# Patient Record
Sex: FEMALE | Race: WHITE | NOT HISPANIC OR LATINO | Employment: PART TIME | URBAN - METROPOLITAN AREA
[De-identification: names, ages, dates, MRNs, and addresses within clinical notes are randomized per-mention and may not be internally consistent; named-entity substitution may affect disease eponyms.]

---

## 2023-10-06 ENCOUNTER — OFFICE VISIT (OUTPATIENT)
Dept: OBGYN CLINIC | Facility: CLINIC | Age: 53
End: 2023-10-06
Payer: COMMERCIAL

## 2023-10-06 VITALS
HEIGHT: 64 IN | BODY MASS INDEX: 22.23 KG/M2 | WEIGHT: 130.2 LBS | SYSTOLIC BLOOD PRESSURE: 100 MMHG | DIASTOLIC BLOOD PRESSURE: 62 MMHG

## 2023-10-06 DIAGNOSIS — Z80.3 FAMILY HISTORY OF BREAST CANCER: ICD-10-CM

## 2023-10-06 DIAGNOSIS — N92.1 MENORRHAGIA WITH IRREGULAR CYCLE: ICD-10-CM

## 2023-10-06 DIAGNOSIS — Z30.9 ENCOUNTER FOR CONTRACEPTIVE MANAGEMENT, UNSPECIFIED TYPE: ICD-10-CM

## 2023-10-06 DIAGNOSIS — N88.2 CERVICAL OS STENOSIS: ICD-10-CM

## 2023-10-06 DIAGNOSIS — N95.1 MENOPAUSAL VAGINAL DRYNESS: ICD-10-CM

## 2023-10-06 DIAGNOSIS — Z12.4 CERVICAL CANCER SCREENING: ICD-10-CM

## 2023-10-06 DIAGNOSIS — Z01.419 ENCOUNTER FOR ANNUAL ROUTINE GYNECOLOGICAL EXAMINATION: Primary | ICD-10-CM

## 2023-10-06 DIAGNOSIS — Z12.31 ENCOUNTER FOR SCREENING MAMMOGRAM FOR MALIGNANT NEOPLASM OF BREAST: ICD-10-CM

## 2023-10-06 PROCEDURE — S0610 ANNUAL GYNECOLOGICAL EXAMINA: HCPCS | Performed by: OBSTETRICS & GYNECOLOGY

## 2023-10-06 RX ORDER — ESTRADIOL 10 UG/1
TABLET VAGINAL
COMMUNITY
Start: 2023-06-30 | End: 2023-10-06

## 2023-10-06 RX ORDER — NORGESTIMATE AND ETHINYL ESTRADIOL 0.25-0.035
KIT ORAL
COMMUNITY
End: 2023-10-06

## 2023-10-06 RX ORDER — ESTRADIOL 0.1 MG/G
CREAM VAGINAL
Qty: 42.5 G | Refills: 4 | Status: SHIPPED | OUTPATIENT
Start: 2023-10-06

## 2023-10-06 RX ORDER — MISOPROSTOL 100 UG/1
TABLET ORAL
Qty: 1 TABLET | Refills: 0 | Status: SHIPPED | OUTPATIENT
Start: 2023-10-06

## 2023-10-06 RX ORDER — DIAPHRAGMS, CONTOURED 65 MM-80MM
DIAPHRAGM VAGINAL AS NEEDED
Qty: 1 EACH | Refills: 0 | Status: SHIPPED | OUTPATIENT
Start: 2023-10-06

## 2023-10-06 NOTE — PROGRESS NOTES
Annual Wellness Visit  [address]  [address] [facility], [address], [place], [address] [facility]    ASSESSMENT/PLAN: Rick Miranda is a 50 y.o. [id number] who presents for annual gynecologic exam.    Encounter for routine gynecologic examination  - Routine well woman exam completed today. - Cervical Cancer Screening: Current ASCCP Guidelines reviewed. Last Pap: last 1-2 years normal per pt. Next Pap Due: today, routine.  - Contraceptive counseling discussed. Current contraception: abstinence - due to pain. - Breast Cancer Screening: Last Mammogram 2024 normal per pt  - Colorectal cancer screening last 4/2023 per pt, next due 2033. - The following were reviewed in today's visit: mammography screening ordered, use and side effects of OCPs, menopause, exercise and healthy diet    Additional problems addressed during this visit:  1. Encounter for annual routine gynecological examination    2. Encounter for screening mammogram for malignant neoplasm of breast  -     Mammo screening bilateral w 3d & cad; Future    3. Family history of breast cancer  -     Mammo screening bilateral w 3d & cad; Future    4. Cervical cancer screening  -     IGP, Aptima HPV, Rfx 16/18,45    5. Menopausal vaginal dryness  Assessment & Plan:  Patient reports long history vaginal dryness and painful intercourse. At this point avoids intercourse. Has been given vaginal estrogen in past by gyn, but admits doesn't use regularly. Discussed lack of estrogen even in perimenopause can cause vaginal dryness, pain with intercourse. Sometimes this can be improved with vaginal lubricants or moisturizers, but if not vaginal estrogen often helps. Discussed cream, tablets and ring. Reviewed non-systemic estrogen, unlike hormone replacement therapy, so does not have same risks. Will try vaginal estrogen cream, which she already has. Discussed proper use and expected time to improvement.     Orders:  -     estradiol (ESTRACE) 0.1 mg/g vaginal cream; Start nightly 1 gram in vagina x 2 weeks, then decrease to 2-3x/week. May decrease to weekly if still effective at lower dose. 6. Menorrhagia with irregular cycle  Assessment & Plan:  Discussed w/ patient possible causes of bleeding every 2 weeks include perimenopause, fibroids, endometrial polyps, precancer or cancer of cervix or uterus. Recommend evaluation with combination of ultrasound, endometrial biopsy. Plan return in couple weeks for endometrial biopsy (I am out of office for most of month, can be done with another provider) and get ultrasound to evaluate. Then return to discuss results and plan. Will hold considering progesterone contraception options while evaluating bleeding. Orders:  -     US pelvis complete w transvaginal; Future    7. Cervical os stenosis  Assessment & Plan:  Cervical stenosis noted on exam while doing pap. Not clinically significant except patient to return for endometrial biopsy. Recommend misoprostol vaginally prior to endometrial biopsy attempt to help in softening cervix and successful biopsy. Orders:  -     miSOPROStol (Cytotec) 100 mcg tablet; Place 1/2 tablet in vagina night before procedure and 1/2 tablet in vagina the morning of procedure. 8. Encounter for contraceptive management, unspecified type  A/P:   Patient recently on OCPs with irregular bleeding. Reviewed risks of continuing estrogen containing contraception (OCPs) with advancing age. Discussed that continued use of OCPs can mask natural menopause, by decreasing symptoms of menopause and causing continuation of periods past natural cessation. Until menopause women can conceive and women of advanced maternal age have increase risks in pregnancy, but also there are small increases in risk of venous thromboembolism, myocardial infarction, or stroke, in women >46yo who use OCPs.   Other hormonal contraceptive options not containing estrogen, ie barrier methods, POs, implants, and IUDs have less acute cardiovascular risks. Due to irregular bleeding and need for evaluation, I recommend holding on hormonal options currently. She is willing to try Caya Diaphragm (used diaphragm in past). Once evaluation for bleeding complete, can consider other options. Gave script and info for Caya Diaphragm.  -     Diaphragm Arc-Spring (Armando Dooley)  [facility] [address]; Insert into the vagina as needed (with intercourse)        Next visit: return for EMB, f/u 1 month consult; 1 year Wellness      CC:  Annual Gynecologic Examination    HPI: Lilian Turner is a 50 y.o. Eli Me who presents for annual gynecologic examination. Celine Arias is a new patient to our office for GYN Wellness. She has concerns for vaginal dryness, menopause and irregular bleeding. Last pap > 1 year ago which was normal, maybe h/o abnormal in past but returned to normal.  Mammo about 1 year ago - normal  Colonoscopy 2023 - next 10 years. Was on Sprintec for about 8 years. Stopped about 6 weeks ago because ran out of script. Reports 5 days of bleeding about every 2 weeks when on pills last couple months and then once off still every 2 weeks. Does admit missed some pills while taking them due to poor pill taker. Currently not sexually active due to vaginal dryness for 7 years - tried vaginal estrogen in past but admits not taking it correctly starts and stops. Gyn History  Patient's last menstrual period was 2023 (approximate). Last Pap: normal per pt 1-2 years    She  reports being sexually active and has had partner(s) who are male. She reports using the following method of birth control/protection: None. OB History      Past Medical History:  No date: IBS (irritable bowel syndrome)  : Tumor of jaw      Comment:  benign     Past Surgical History:  2014:  SECTION  : MANDIBLE SURGERY;  Right      Comment:  benign tumor removal with bone graft  : WISDOM TOOTH EXTRACTION; N/A     Family History Problem Relation Age of Onset   • Heart attack Father    • Breast cancer Paternal Grandmother    • Uterine cancer Neg Hx    • Ovarian cancer Neg Hx    • Colon cancer Neg Hx         Social History     Tobacco Use   • Smoking status: Never   • Smokeless tobacco: Never   Vaping Use   • Vaping Use: Never used   Substance Use Topics   • Alcohol use: Never   • Drug use: Never          Current Outpatient Medications:   •  Diaphragm Arc-Spring (Susannah Boot) [address] [facility] [address], Insert into the vagina as needed (with intercourse), Disp: 1 each, Rfl: 0  •  estradiol (ESTRACE) 0.1 mg/g vaginal cream, Start nightly 1 gram in vagina x 2 weeks, then decrease to 2-3x/week. May decrease to weekly if still effective at lower dose., Disp: 42.5 g, Rfl: 4  •  Estradiol 10 % CREA, Use, Disp: , Rfl:   •  miSOPROStol (Cytotec) 100 mcg tablet, Place 1/2 tablet in vagina night before procedure and 1/2 tablet in vagina the morning of procedure., Disp: 1 tablet, Rfl: 0  •  Multiple Vitamins-Minerals (WOMENS 50+ ADVANCED PO), Take by mouth, Disp: , Rfl:     She is allergic to ciprofloxacin. .    ROS negative except as noted in HPI    Objective:  /62   Ht 5' 4.25" (1.632 m)   Wt 59.1 kg (130 lb 3.2 oz)   LMP 09/22/2023 (Approximate)   Breastfeeding No   BMI 22.18 kg/m²      Physical Exam  Constitutional:       Appearance: Normal appearance. Chest:   Breasts:     Right: Normal. No mass or tenderness. Left: Normal. No mass or tenderness. Abdominal:      Palpations: Abdomen is soft. Tenderness: There is no abdominal tenderness. Genitourinary:     General: Normal vulva. Vagina: No bleeding or lesions. Cervix: No cervical bleeding (cervical stenosis on exam). Uterus: Normal. Not tender. Adnexa:         Right: No mass or tenderness. Left: No mass or tenderness. Rectum: No mass or external hemorrhoid. Normal anal tone.       Comments: Atrophic changes appropriate to age  Musculoskeletal:         General: Normal range of motion. Lymphadenopathy:      Upper Body:      Right upper body: No axillary adenopathy. Left upper body: No axillary adenopathy. Neurological:      Mental Status: She is alert and oriented to person, place, and time.    Psychiatric:         Mood and Affect: Mood normal.         Behavior: Behavior normal.

## 2023-10-06 NOTE — ASSESSMENT & PLAN NOTE
Cervical stenosis noted on exam while doing pap. Not clinically significant except patient to return for endometrial biopsy. Recommend misoprostol vaginally prior to endometrial biopsy attempt to help in softening cervix and successful biopsy.

## 2023-10-06 NOTE — PATIENT INSTRUCTIONS
- Maintain healthy weight with BMI ideally between 18-25.    - Eat a healthy diet, including multiple servings of vegetables and fruits, as well as lean protein sources. - Get at least 150 minutes of moderate aerobic activity or 75 minutes of vigorous aerobic activity a week, or a combination of moderate and vigorous activity. Greater amounts of exercise will provide an even greater health benefit. - Ensure diet provides 1200mg of Calcium daily (divided) and 800IU of vitamin D, or take supplements to meet this. - Safe sex practices recommended. - Resources - information on birth control and sexually transmitted infections - www.bedsider. org            - information on sexually transmitted infections - www.cdc.gov/std/     Evaluation for irregular bleeding   - pelvic ultrasound   - endometrial biopsy - use misoprostol vaginally before procedure   - return in 1 month with Dr. Billy King to review.

## 2023-10-07 NOTE — ASSESSMENT & PLAN NOTE
Discussed w/ patient possible causes of bleeding every 2 weeks include perimenopause, fibroids, endometrial polyps, precancer or cancer of cervix or uterus. Recommend evaluation with combination of ultrasound, endometrial biopsy. Plan return in couple weeks for endometrial biopsy (I am out of office for most of month, can be done with another provider) and get ultrasound to evaluate. Then return to discuss results and plan. Will hold considering progesterone contraception options while evaluating bleeding.

## 2023-10-07 NOTE — ASSESSMENT & PLAN NOTE
Patient reports long history vaginal dryness and painful intercourse. At this point avoids intercourse. Has been given vaginal estrogen in past by gyn, but admits doesn't use regularly. Discussed lack of estrogen even in perimenopause can cause vaginal dryness, pain with intercourse. Sometimes this can be improved with vaginal lubricants or moisturizers, but if not vaginal estrogen often helps. Discussed cream, tablets and ring. Reviewed non-systemic estrogen, unlike hormone replacement therapy, so does not have same risks. Will try vaginal estrogen cream, which she already has. Discussed proper use and expected time to improvement.

## 2023-10-11 LAB
CYTOLOGIST CVX/VAG CYTO: NORMAL
DX ICD CODE: NORMAL
HPV GENOTYPE REFLEX: NORMAL
HPV I/H RISK 4 DNA CVX QL PROBE+SIG AMP: NEGATIVE
OTHER STN SPEC: NORMAL
PATH REPORT.FINAL DX SPEC: NORMAL
SL AMB NOTE:: NORMAL
SL AMB SPECIMEN ADEQUACY: NORMAL
SL AMB TEST METHODOLOGY: NORMAL

## 2023-10-26 ENCOUNTER — TELEPHONE (OUTPATIENT)
Dept: OBGYN CLINIC | Facility: CLINIC | Age: 53
End: 2023-10-26

## 2023-10-26 NOTE — TELEPHONE ENCOUNTER
Thao Kim spoke with Jim ESPITIA, as to confusion about when to use medication prior to procedure. She could not remember what procedure needed to use medication for. Thao Kim was to insert Cytotec last night & this morning for EMBx appt. . Informed KARTHIK Wilson of Thao Kim not inserting medication last night or early this AM.. LMOM with [facility]'s message: It likely will not help if she takes it now. We can attempt the biopsy without it although should be aware there is a risk we me not be able to successfully perform or she can chose to reschedule. Awaiting pt response if keeping today's appt or rescheduling.

## 2023-10-26 NOTE — TELEPHONE ENCOUNTER
Patient is r/s for 11/9/23 with [facility]. Patient is asking for prescription refill on medication since she only have half left of the Cytotec.

## 2023-10-31 ENCOUNTER — TELEPHONE (OUTPATIENT)
Dept: OBGYN CLINIC | Facility: CLINIC | Age: 53
End: 2023-10-31

## 2023-11-07 ENCOUNTER — TELEPHONE (OUTPATIENT)
Dept: OBGYN CLINIC | Facility: CLINIC | Age: 53
End: 2023-11-07

## 2023-11-07 DIAGNOSIS — N88.2 CERVICAL OS STENOSIS: ICD-10-CM

## 2023-11-07 RX ORDER — MISOPROSTOL 100 UG/1
TABLET ORAL
Qty: 1 TABLET | Refills: 0 | Status: SHIPPED | OUTPATIENT
Start: 2023-11-07 | End: 2023-11-09

## 2023-11-07 NOTE — TELEPHONE ENCOUNTER
Rimma Whatley l/m needing to confirm upcoming appt. Return call to [facility], reviewed next appt 11/9/2023 is for endometrial biopsy with [facility] in [place] office. 11/24/2023 appt is with Dr. Flynn Daily for f/u to pelvic u/s and biopsy. Rimma Whatley is asking for additonal rx for cytotec. Inquiring when is she to be using the cytotec. Advised cytotec is for the EMB. She mistakenly used 1/2 of the cytotec tablet for an u/s that was previously scheduled. Requested additional rx for cytotec be forwarded to pharmacy on file for EMB on 11/9/2023.  Dr. Flynn Daily, please review and advise

## 2023-11-09 ENCOUNTER — PROCEDURE VISIT (OUTPATIENT)
Dept: OBGYN CLINIC | Facility: CLINIC | Age: 53
End: 2023-11-09

## 2023-11-09 VITALS
DIASTOLIC BLOOD PRESSURE: 60 MMHG | BODY MASS INDEX: 21.79 KG/M2 | HEIGHT: 65 IN | SYSTOLIC BLOOD PRESSURE: 108 MMHG | WEIGHT: 130.8 LBS

## 2023-11-09 DIAGNOSIS — N92.1 MENORRHAGIA WITH IRREGULAR CYCLE: Primary | ICD-10-CM

## 2023-11-09 DIAGNOSIS — Z32.02 PREGNANCY EXAMINATION OR TEST, NEGATIVE RESULT: ICD-10-CM

## 2023-11-09 LAB — SL AMB POCT URINE HCG: NEGATIVE

## 2023-11-09 NOTE — PROGRESS NOTES
Assessment/Plan:    Menorrhagia with irregular cycle  After multiple attempts unable to pass smallest dilator into external os secondary to cervical stenosis. Patient has pelvic ultrasound scheduled 2023 and has follow up appointment with Dr. Sara Martinez 2023. Problem List Items Addressed This Visit          Other    Menorrhagia with irregular cycle - Primary     After multiple attempts unable to pass smallest dilator into external os secondary to cervical stenosis. Patient has pelvic ultrasound scheduled 2023 and has follow up appointment with Dr. Sara Martinez 2023. Other Visit Diagnoses       Pregnancy examination or test, negative result        Relevant Orders    POCT urine HCG (Completed)              Subjective:      Patient ID: Sandy Pinto is a 50 y.o. female. HPI  49 yo seen for endometrial biopsy. Saw Dr. Sara Martinez for irregular bleeding. Recommended pelvic ultrasound and endometrial biopsy to further evaluate. Patient with cervical stenosis. Used cytotec last night and this am. Reports cramping and started with bleeding today. Patient reports not sexually active, no chance or pregnancy. Negative UPT in office. The following portions of the patient's history were reviewed and updated as appropriate: She  has a past medical history of IBS (irritable bowel syndrome) and Tumor of jaw (). She   Patient Active Problem List    Diagnosis Date Noted    Menopausal vaginal dryness 10/06/2023    Menorrhagia with irregular cycle 10/06/2023    Cervical os stenosis 10/06/2023     She  has a past surgical history that includes  section (2014); [place] tooth extraction (N/A, ); and Mandible surgery (Right, ). Her family history includes Atrial fibrillation in her maternal grandmother and mother; Breast cancer in her paternal grandmother; Cancer in her maternal aunt;  Heart attack in her father; No Known Problems in her paternal grandfather, sister, and son; Parkinsonism in her maternal grandfather. She  reports that she has never smoked. She has never used smokeless tobacco. She reports that she does not drink alcohol and does not use drugs. Current Outpatient Medications   Medication Sig Dispense Refill    Diaphragm [facility] (Mitzi Braulio) [address] [facility] [address] Insert into the vagina as needed (with intercourse) (Patient not taking: Reported on 11/9/2023) 1 each 0    estradiol (ESTRACE) 0.1 mg/g vaginal cream Start nightly 1 gram in vagina x 2 weeks, then decrease to 2-3x/week. May decrease to weekly if still effective at lower dose. (Patient not taking: Reported on 11/9/2023) 42.5 g 4    Estradiol 10 % CREA Use (Patient not taking: Reported on 11/9/2023)      Multiple Vitamins-Minerals (WOMENS 50+ ADVANCED PO) Take by mouth (Patient not taking: Reported on 11/9/2023)       No current facility-administered medications for this visit. She is allergic to ciprofloxacin. .    Review of Systems   Constitutional:  Negative for fatigue, fever and unexpected weight change. HENT:  Negative for dental problem and sinus pressure. Eyes:  Negative for visual disturbance. Respiratory:  Negative for cough, shortness of breath and wheezing. Cardiovascular:  Negative for chest pain. Gastrointestinal:  Negative for abdominal pain, blood in stool, constipation, diarrhea, nausea and vomiting. Endocrine: Negative for polydipsia. Genitourinary:  Negative for difficulty urinating, dyspareunia, dysuria, frequency, hematuria, pelvic pain and urgency. Musculoskeletal:  Negative for arthralgias and back pain. Neurological:  Negative for dizziness, seizures, light-headedness and headaches. Psychiatric/Behavioral:  Negative for suicidal ideas. The patient is not nervous/anxious.           Objective:      /60 (BP Location: Left arm, Patient Position: Sitting, Cuff Size: Standard)   Ht 5' 4.5" (1.638 m)   Wt 59.3 kg (130 lb 12.8 oz)   LMP 11/09/2023   BMI 22.11 kg/m²          Physical Exam  Constitutional:       Appearance: She is well-developed. Genitourinary:     Labia:         Right: No rash, tenderness, lesion or injury. Left: No rash, tenderness, lesion or injury. Urethra: No prolapse, urethral pain, urethral swelling or urethral lesion. Vagina: No signs of injury and foreign body. No vaginal discharge, erythema, tenderness or bleeding. Cervix: No cervical motion tenderness, discharge or friability. Skin:     General: Skin is warm and dry. Coloration: Skin is not pale. Findings: No erythema or rash. Neurological:      Mental Status: She is alert and oriented to person, place, and time. Endometrial biopsy    Date/Time: 11/9/2023 10:20 AM    Performed by: Kvng Bañuelos PA-C  Authorized by: Alanna Jara MD  Universal Protocol:  Consent: Verbal consent obtained. Written consent not obtained. Risks and benefits: risks, benefits and alternatives were discussed  Consent given by: patient  Time out: Immediately prior to procedure a "time out" was called to verify the correct patient, procedure, equipment, support staff and site/side marked as required. Timeout called at: 11/9/2023 10:20 AM.  Patient understanding: patient states understanding of the procedure being performed  Patient consent: the patient's understanding of the procedure matches consent given  Procedure consent: procedure consent matches procedure scheduled  Relevant documents: relevant documents present and verified  Required items: required blood products, implants, devices, and special equipment available  Patient identity confirmed: verbally with patient    Indication:     Indications:  Other disorder of menstruation and other abnormal bleeding from female genital tract    Procedure:     Procedure: endometrial biopsy with Pipelle      A bivalve speculum was placed in the vagina: yes      Cervix cleaned and prepped: yes (with betadine)      The cervix was dilated: yes (attempted to dilate cervix.  unable to pass smallest dilator into external os after multiple attempts)      Unable to perform due to: cervical stenosis    Findings:     Cervix: stenotic

## 2023-11-09 NOTE — ASSESSMENT & PLAN NOTE
After multiple attempts unable to pass smallest dilator into external os secondary to cervical stenosis. Patient has pelvic ultrasound scheduled 11/20/2023 and has follow up appointment with Dr. Delsie Jeans 11/24/2023.

## 2023-11-23 NOTE — PROGRESS NOTES
GYN - Problem Visit  [address]  [address] [facility], [address], [place], [address] [facility],[address]    Assessment/Plan:  1. Menorrhagia with irregular cycle  Assessment & Plan:  Patient reports continues to have a period bleeding about every 2 weeks. Reviewed ultrasound shows lining thin (4mm - similar to postmenopausal woman). Unfortunately unable to do EMB even with cytotec pretreatment. Given u/s findings - unlikely bleeding due to hyperplasia, cancer or polyp. Most likely perimenopausal changes. If needed can do hysteroscopy D&C for endometrial sampling - but given thin lining, I don't feel absolutely necessary. Porsha Spears does not want hysteroscopy unless necessary. Discussed options for management of bleeding OCPs, progesterone pills, IUD. Patient also wishes contraception. After review of options - will start progesterone only pills for contraception and see how periods respond. She is okay if bleeding continues abnormal.   Will start POPs now. Return in 3 months to review. If still abnormal bleeding in 4-6 months consider repeat u/s to make sure lining not increasing and need hyysteroscopy. Patient agrees. 2. Encounter for BCP (birth control pills) initial prescription  A/P:   Pt wishes to use progesterone only pills. Reviewed w/ pt take same active pill EVERYDAY. Must be taken w/in the same hour for effectiveness. If >1 hr off need to use condoms for 3-4 days after. Also cautioned may not have periods or may have irregular bleeding w/ POPs. F/u in 3 months. -     norethindrone (MICRONOR) 0.35 MG tablet; Take 1 tablet (0.35 mg total) by mouth daily    3. Menopausal vaginal dryness  Assessment & Plan:  Did not start vaginal estrogen for pain with intercourse. Okay to start. Low risk to effect lining and low risk factors for endometrial cancer. Orders:  -     estradiol (ESTRACE) 0.1 mg/g vaginal cream; Start nightly 1 gram in vagina x 2 weeks, then decrease to 2-3x/week.   May decrease to weekly if still effective at lower dose. I have spent a total time of 30 minutes on 2023 in caring for this patient including Diagnostic results, Prognosis, Risks and benefits of tx options, Instructions for management, Patient and family education, Impressions, Counseling / Coordination of care, Reviewing / ordering tests, medicine, procedures  , and Obtaining or reviewing history  . Subjective:   Spike Manzanares is a 48 y.o.  female. CC: here to discuss results      HPI:   Consuelo Trotter is here to review testing and results. Seen 10/2023 "Was on [email or web address] for about 8 years. Stopped about 6 weeks ago because ran out of script. Reports 5 days of bleeding about every 2 weeks when on pills last couple months and then once off still every 2 weeks. Does admit missed some pills while taking them due to poor pill taker."  Before and after stopping OCPs in 2023 - bleeding every 2 weeks. No pain. 2023 u/s (compared to 2020) - uterus 9.5 x 3.4 x 5.4cm, 1.5cm fundal fibroid, EMS 4mm, ovaries normal.  EMB attempted in office 2023 and failed due to stenosis - even with pretreatment cytotec vaginally. Currently - still bleeding about every 2 weeks. Still avoiding intercourse due to pain - did not start vaginal estrogen. Wanted to wait until testing done. Gyn History  Patient's last menstrual period was 2023. Last pap smear: 10/06/2023    She  reports that she is not currently sexually active and has had partner(s) who are male. She reports using the following method of birth control/protection: None. OB History      Past Medical History:  No date: IBS (irritable bowel syndrome)  : Tumor of jaw      Comment:  benign     Past Surgical History:  2014:  SECTION  : MANDIBLE SURGERY;  Right      Comment:  benign tumor removal with bone graft  : WISDOM TOOTH EXTRACTION; N/A     Social History     Tobacco Use    Smoking status: Never    Smokeless tobacco: Never   Vaping Use    Vaping Use: Never used   Substance Use Topics    Alcohol use: Never    Drug use: Never          Current Outpatient Medications:   No current medications    She is allergic to ciprofloxacin. .    ROS: Review of Systems   Constitutional: Negative. Gastrointestinal: Negative. Genitourinary:  Positive for menstrual problem and vaginal pain (with intercourse). Psychiatric/Behavioral: Negative. Objective:  BP 96/58 (BP Location: Left arm, Patient Position: Sitting, Cuff Size: Standard)   Ht 5' 4.5" (1.638 m)   Wt 58.6 kg (129 lb 3.2 oz)   LMP 11/09/2023   BMI 21.83 kg/m²        Physical Exam  Constitutional:       Appearance: Normal appearance. Neurological:      Mental Status: She is alert and oriented to person, place, and time.    Psychiatric:         Behavior: Behavior normal.

## 2023-11-24 ENCOUNTER — OFFICE VISIT (OUTPATIENT)
Dept: OBGYN CLINIC | Facility: CLINIC | Age: 53
End: 2023-11-24
Payer: COMMERCIAL

## 2023-11-24 VITALS
DIASTOLIC BLOOD PRESSURE: 58 MMHG | HEIGHT: 65 IN | WEIGHT: 129.2 LBS | BODY MASS INDEX: 21.52 KG/M2 | SYSTOLIC BLOOD PRESSURE: 96 MMHG

## 2023-11-24 DIAGNOSIS — Z30.011 ENCOUNTER FOR BCP (BIRTH CONTROL PILLS) INITIAL PRESCRIPTION: ICD-10-CM

## 2023-11-24 DIAGNOSIS — N92.1 MENORRHAGIA WITH IRREGULAR CYCLE: Primary | ICD-10-CM

## 2023-11-24 DIAGNOSIS — N95.1 MENOPAUSAL VAGINAL DRYNESS: ICD-10-CM

## 2023-11-24 PROCEDURE — 99214 OFFICE O/P EST MOD 30 MIN: CPT | Performed by: OBSTETRICS & GYNECOLOGY

## 2023-11-24 RX ORDER — ACETAMINOPHEN AND CODEINE PHOSPHATE 120; 12 MG/5ML; MG/5ML
1 SOLUTION ORAL DAILY
Qty: 28 TABLET | Refills: 3 | Status: SHIPPED | OUTPATIENT
Start: 2023-11-24

## 2023-11-24 RX ORDER — ESTRADIOL 0.1 MG/G
CREAM VAGINAL
Qty: 42.5 G | Refills: 4 | Status: SHIPPED | OUTPATIENT
Start: 2023-11-24

## 2023-11-24 NOTE — ASSESSMENT & PLAN NOTE
Patient reports continues to have a period bleeding about every 2 weeks. Reviewed ultrasound shows lining thin (4mm - similar to postmenopausal woman). Unfortunately unable to do EMB even with cytotec pretreatment. Given u/s findings - unlikely bleeding due to hyperplasia, cancer or polyp. Most likely perimenopausal changes. If needed can do hysteroscopy D&C for endometrial sampling - but given thin lining, I don't feel absolutely necessary. Phong Villarreal does not want hysteroscopy unless necessary. Discussed options for management of bleeding OCPs, progesterone pills, IUD. Patient also wishes contraception. After review of options - will start progesterone only pills for contraception and see how periods respond. She is okay if bleeding continues abnormal.   Will start POPs now. Return in 3 months to review. If still abnormal bleeding in 4-6 months consider repeat u/s to make sure lining not increasing and need hyysteroscopy. Patient agrees.

## 2023-11-24 NOTE — ASSESSMENT & PLAN NOTE
Did not start vaginal estrogen for pain with intercourse. Okay to start. Low risk to effect lining and low risk factors for endometrial cancer.

## 2023-11-24 NOTE — PATIENT INSTRUCTIONS
Progesterone Only Pills (POPs)   - take same active pill EVERYDAY - no placebo pills    - must be taken w/in the same hour daily for effectiveness.   If >1 hr off need to use condoms for 3-4 days after.      - may not have periods or may have irregular bleeding w/ POPs

## 2024-02-02 ENCOUNTER — OFFICE VISIT (OUTPATIENT)
Dept: OBGYN CLINIC | Facility: CLINIC | Age: 54
End: 2024-02-02
Payer: COMMERCIAL

## 2024-02-02 VITALS
WEIGHT: 132.2 LBS | BODY MASS INDEX: 22.57 KG/M2 | HEIGHT: 64 IN | DIASTOLIC BLOOD PRESSURE: 68 MMHG | SYSTOLIC BLOOD PRESSURE: 100 MMHG

## 2024-02-02 DIAGNOSIS — Z30.41 SURVEILLANCE FOR BIRTH CONTROL, ORAL CONTRACEPTIVES: ICD-10-CM

## 2024-02-02 DIAGNOSIS — N92.1 MENORRHAGIA WITH IRREGULAR CYCLE: Primary | ICD-10-CM

## 2024-02-02 PROCEDURE — 99213 OFFICE O/P EST LOW 20 MIN: CPT | Performed by: OBSTETRICS & GYNECOLOGY

## 2024-02-02 RX ORDER — ACETAMINOPHEN AND CODEINE PHOSPHATE 120; 12 MG/5ML; MG/5ML
1 SOLUTION ORAL DAILY
Qty: 84 TABLET | Refills: 3 | Status: SHIPPED | OUTPATIENT
Start: 2024-02-02

## 2024-02-02 RX ORDER — ACETAMINOPHEN AND CODEINE PHOSPHATE 120; 12 MG/5ML; MG/5ML
1 SOLUTION ORAL DAILY
Qty: 84 TABLET | Refills: 3 | Status: SHIPPED | OUTPATIENT
Start: 2024-02-02 | End: 2024-02-02

## 2024-02-02 NOTE — PROGRESS NOTES
[facility] [place]  [address], [address], [place] [address]    Assessment/Plan:  1. Menorrhagia with irregular cycle  Assessment & Plan:  Patient with bleeding about every 2 weeks after stopping OCPs.  EMS 4mm on u/s - attempted EMB but failed due to stenosis.  Given EMS thin, offered control bleeding with progesterone and see if improves, if not - then repeat u/s and if thickened lining consider D&C hysteroscopy.  Pt started POPs Dec, as did want contraception as well.  States last period about 4 weeks ago.  Appear to be spreading out.  Will continue.      2. Surveillance for birth control, oral contraceptives  -     norethindrone (MICRONOR) 0.35 MG tablet; Take 1 tablet (0.35 mg total) by mouth daily    I have spent a total time of 20 minutes on 24 in caring for this patient including Risks and benefits of tx options, Impressions, Counseling / Coordination of care, Documenting in the medical record, Reviewing / ordering tests, medicine, procedures  , and Obtaining or reviewing history  .      Subjective:   Shital Bertrand is a 53 y.o.  female.  CC: periods spreading out      HPI:   Shital presents to f/u bleeding on POPs.    Seen 10/2023 for Wellness and c/o bleeding q 2 weeks since stopping OCPs in August.  Evaluation with u/s showed 4mm EMS but EMB failed due to cervical stenosis even with cytotec.    After discussion - started POPs for try to regulate bleeding and contraception.    States a couple months on POPs, she feels periods further apart.  Last period 4 weeks ago, lasting 5-6 days.  Feeling some fatigue and feels although increasing exercise recently, no weight loss.    Discussed menopause and some metabolism changes with perimenopause.  Encouraged diet changes.      Gyn History  Patient's last menstrual period was 2024 (approximate).       Last pap smear: 10/06/2023    She  reports being sexually active and has had partner(s) who are male.       OB  "History      Past Medical History:  2023: History of screening mammography      Comment:  Negative per pt. Done at [place]  No date: IBS (irritable bowel syndrome)  : Tumor of jaw      Comment:  benign     Past Surgical History:  2014:  SECTION  : MANDIBLE SURGERY; Right      Comment:  benign tumor removal with bone graft  1990: WISDOM TOOTH EXTRACTION; N/A     Social History     Tobacco Use    Smoking status: Never    Smokeless tobacco: Never   Vaping Use    Vaping status: Never Used   Substance Use Topics    Alcohol use: Never    Drug use: Never          Current Outpatient Medications:     estradiol (ESTRACE) 0.1 mg/g vaginal cream, Start nightly 1 gram in vagina x 2 weeks, then decrease to 2-3x/week.  May decrease to weekly if still effective at lower dose., Disp: 42.5 g, Rfl: 4    norethindrone (MICRONOR) 0.35 MG tablet, Take 1 tablet (0.35 mg total) by mouth daily, Disp: 84 tablet, Rfl: 3    She is allergic to ciprofloxacin..    ROS: Review of Systems   Constitutional: Negative.    Gastrointestinal: Negative.    Genitourinary: Negative.    Psychiatric/Behavioral: Negative.         Objective:  /68   Ht 5' 4.25\" (1.632 m)   Wt 60 kg (132 lb 3.2 oz)   LMP 2024 (Approximate)   Breastfeeding No   BMI 22.52 kg/m²      Physical Exam  Constitutional:       Appearance: Normal appearance.   Neurological:      Mental Status: She is alert and oriented to person, place, and time.   Psychiatric:         Behavior: Behavior normal.         "

## 2024-02-02 NOTE — ASSESSMENT & PLAN NOTE
Patient with bleeding about every 2 weeks after stopping OCPs.  EMS 4mm on u/s - attempted EMB but failed due to stenosis.  Given EMS thin, offered control bleeding with progesterone and see if improves, if not - then repeat u/s and if thickened lining consider D&C hysteroscopy.  Pt started POPs Dec, as did want contraception as well.  States last period about 4 weeks ago.  Appear to be spreading out.  Will continue.

## 2024-05-03 ENCOUNTER — OFFICE VISIT (OUTPATIENT)
Dept: OBGYN CLINIC | Facility: CLINIC | Age: 54
End: 2024-05-03
Payer: COMMERCIAL

## 2024-05-03 VITALS
WEIGHT: 128.8 LBS | SYSTOLIC BLOOD PRESSURE: 108 MMHG | BODY MASS INDEX: 21.99 KG/M2 | DIASTOLIC BLOOD PRESSURE: 63 MMHG | HEIGHT: 64 IN

## 2024-05-03 DIAGNOSIS — M25.50 ARTHRALGIA, UNSPECIFIED JOINT: Primary | ICD-10-CM

## 2024-05-03 PROCEDURE — 99213 OFFICE O/P EST LOW 20 MIN: CPT | Performed by: OBSTETRICS & GYNECOLOGY

## 2024-05-03 NOTE — PROGRESS NOTES
[facility] [place]  [address], [address], [place] [address]    Assessment/Plan:  1. Arthralgia, unspecified joint  Comments:  recent knee and elbow joint pain.  Periods more irregular.  Denies hot flashes, night sweats or mood changes.  Feels joint pain may be due to menopause.  A/P:   Discussed menopause definition and typical progression towards menopause.  Menopausal symptoms can vary wildely and sometimes it is hard to determine if symptoms are due to menopause (ie hormonal changes) or aging.  While joint pain may be due to hormonal changes of menopause, there isn't a lot of evidence of hormone replacement improving this.  Hormone replacement is used for symptoms like hot flashes, night sweats or mood changes that significantly interfere with quality of life.  She doesn't have these.  Reviewed sometimes magnesium supplements can help with joint pain, encouraged to try.  Also Estroven  - over the counter menopause supplement has many symptom driven formulas, encouraged to review and see if there is one for joint health.  If not improved recom see PCP or orthopedist.  She agrees to plan.    I have spent a total time of 20 minutes on 2024 in caring for this patient including Prognosis, Risks and benefits of tx options, Instructions for management, Patient and family education, Impressions, Counseling / Coordination of care, and Obtaining or reviewing history  .      Subjective:   Shital Bertrand is a 53 y.o.  female.  CC: I'm having joint pain      HPI:   Shital presents to discuss symptoms she feels may be due to menopause.    She is currently on a progesterone only pill.  She reports her last period about 6 weeks ago.    She reports last summer inside elbow pain when throwing - dx tendonitis and PCP gave arm splints.  Some improvement since then but still on some on right side and now pain in bicep on right.  She notices it at night in bed.  More recently she feels pain in knees  "\"sounds crunching\" when stands from sitting after awhile.  States once moving no significant pain.    She denies hot flashes, night sweats or mood changes.        Gyn History  No LMP recorded (lmp unknown).       Last pap smear: 10/06/2023    She  reports being sexually active and has had partner(s) who are male. She reports using the following method of birth control/protection: OCP.       OB History      Past Medical History:  2023: History of screening mammography      Comment:  Negative per pt. Done at [place]  No date: IBS (irritable bowel syndrome)  : Tumor of jaw      Comment:  benign     Past Surgical History:  2014:  SECTION  : MANDIBLE SURGERY; Right      Comment:  benign tumor removal with bone graft  : WISDOM TOOTH EXTRACTION; N/A     Social History     Tobacco Use    Smoking status: Never    Smokeless tobacco: Never   Vaping Use    Vaping status: Never Used   Substance Use Topics    Alcohol use: Never    Drug use: Never          Current Outpatient Medications:     estradiol (ESTRACE) 0.1 mg/g vaginal cream, Start nightly 1 gram in vagina x 2 weeks, then decrease to 2-3x/week.  May decrease to weekly if still effective at lower dose., Disp: 42.5 g, Rfl: 4    norethindrone (MICRONOR) 0.35 MG tablet, Take 1 tablet (0.35 mg total) by mouth daily, Disp: 84 tablet, Rfl: 3    She is allergic to ciprofloxacin..    ROS: Review of Systems   Constitutional: Negative.    Gastrointestinal: Negative.    Genitourinary: Negative.    Musculoskeletal:  Positive for arthralgias.   Psychiatric/Behavioral: Negative.         Objective:  /63 (BP Location: Left arm, Patient Position: Sitting, Cuff Size: Standard)   Ht 5' 3.75\" (1.619 m)   Wt 58.4 kg (128 lb 12.8 oz)   LMP  (LMP Unknown)   BMI 22.28 kg/m²      Physical Exam  Constitutional:       Appearance: Normal appearance.   Neurological:      Mental Status: She is alert and oriented to person, place, and time. "   Psychiatric:         Behavior: Behavior normal.

## 2024-11-07 ENCOUNTER — ANNUAL EXAM (OUTPATIENT)
Dept: OBGYN CLINIC | Facility: CLINIC | Age: 54
End: 2024-11-07
Payer: COMMERCIAL

## 2024-11-07 VITALS
HEIGHT: 64 IN | DIASTOLIC BLOOD PRESSURE: 62 MMHG | BODY MASS INDEX: 22.2 KG/M2 | SYSTOLIC BLOOD PRESSURE: 118 MMHG | WEIGHT: 130 LBS

## 2024-11-07 DIAGNOSIS — Z01.419 ENCOUNTER FOR ANNUAL ROUTINE GYNECOLOGICAL EXAMINATION: Primary | ICD-10-CM

## 2024-11-07 DIAGNOSIS — Z30.41 SURVEILLANCE FOR BIRTH CONTROL, ORAL CONTRACEPTIVES: ICD-10-CM

## 2024-11-07 DIAGNOSIS — N95.1 MENOPAUSAL VAGINAL DRYNESS: ICD-10-CM

## 2024-11-07 DIAGNOSIS — Z12.31 ENCOUNTER FOR SCREENING MAMMOGRAM FOR MALIGNANT NEOPLASM OF BREAST: ICD-10-CM

## 2024-11-07 PROCEDURE — S0612 ANNUAL GYNECOLOGICAL EXAMINA: HCPCS | Performed by: OBSTETRICS & GYNECOLOGY

## 2024-11-07 RX ORDER — ACETAMINOPHEN AND CODEINE PHOSPHATE 120; 12 MG/5ML; MG/5ML
1 SOLUTION ORAL DAILY
Qty: 84 TABLET | Refills: 4 | Status: SHIPPED | OUTPATIENT
Start: 2024-11-07

## 2024-11-07 NOTE — LETTER
2024     HEIDI Bryant  [address] [address]    Patient: Shital Bertrand   YOB: 1970   Date of Visit: 2024       Dear Dr. Russ:    Thank you for referring Shital Bertrand to me for evaluation. Below are my notes for this consultation.    If you have questions, please do not hesitate to call me. I look forward to following your patient along with you.         Sincerely,        Isabelle Wheat MD        CC: No Recipients    Isabelle Wheat MD  2024  1:31 PM  Sign when Signing Visit  Annual Wellness Visit  [facility] - [address], [address], [place] [address]    ASSESSMENT/PLAN: Shital Bertrand is a 53 y.o.  who presents for annual gynecologic exam.    Encounter for routine gynecologic examination  - Routine well woman exam completed today.  - Cervical Cancer Screening: Current ASCCP Guidelines reviewed. Last Pap: 10/06/2023 normal. Past abnormal pap: none.  Next Pap Due: 2 years.  - Contraceptive counseling discussed.  Current contraception: oral progesterone-only contraceptive,   - Breast Cancer Screening: Last Mammogram 2024 per pt normal  - Colorectal cancer screening last 2023 per pt, next due .  - The following were reviewed in today's visit: mammography screening ordered, menopause, exercise, and healthy diet    Additional problems addressed during this visit:  1. Encounter for annual routine gynecological examination  2. Encounter for screening mammogram for malignant neoplasm of breast  -     Mammo screening bilateral w 3d and cad; Future  3. Surveillance for birth control, oral contraceptives  A/P:   On progesterone only pills.  Light periods.  Skips some months.  No issues.  Discussed some irregularity is normal.  Must take some time daily for efficacy as contraception.  Wishes refill.    -     norethindrone (MICRONOR) 0.35 MG tablet; Take 1 tablet (0.35 mg total) by mouth daily  4. Menopausal vaginal  dryness  Assessment & Plan:  Was using vaginal estrogen for vaginal dryness and pain with intercourse - noted improvement.  States haven't used in a couple months and no pain.  Reviewed proper use and can restart anytime.  Offered refill - she declined.  Will call if needs it.      Next visit: 1 year Wellness      CC:  Annual Gynecologic Examination    HPI: Shital Bertrand is a 53 y.o.  who presents for annual gynecologic examination.  She denies any breast, urinary or pelvic issues at today's visit.    On progesterone only pills.  Went 6 months without periods and then returned in summer.  LMP 1 month ago.    Sexually active.  No new partners.  Using vaginal estrogen couple times a week with improvement but hasn't used in months and no pain.        Gyn History  Patient's last menstrual period was 10/07/2024 (approximate).     Last Pap: 10/06/2023 was normal    She  reports being sexually active and has had partner(s) who are male. She reports using the following method of birth control/protection: OCP.       OB History      Past Medical History:  2023: History of screening mammography      Comment:  Negative per pt. Done at [place]  No date: IBS (irritable bowel syndrome)  : Tumor of jaw      Comment:  benign     Past Surgical History:  2014:  SECTION  : MANDIBLE SURGERY; Right      Comment:  benign tumor removal with bone graft  : WISDOM TOOTH EXTRACTION; N/A     Family History   Problem Relation Age of Onset   • Atrial fibrillation Mother    • Heart attack Father    • No Known Problems Sister    • No Known Problems Son    • Atrial fibrillation Maternal Grandmother    • Parkinsonism Maternal Grandfather    • Breast cancer Paternal Grandmother    • No Known Problems Paternal Grandfather    • Cancer Maternal Aunt    • Uterine cancer Neg Hx    • Ovarian cancer Neg Hx    • Colon cancer Neg Hx         Social History     Tobacco Use   • Smoking status: Never   • Smokeless  "tobacco: Never   Vaping Use   • Vaping status: Never Used   Substance Use Topics   • Alcohol use: Never   • Drug use: Never          Current Outpatient Medications:   •  norethindrone (MICRONOR) 0.35 MG tablet, Take 1 tablet (0.35 mg total) by mouth daily, Disp: 84 tablet, Rfl: 4  •  estradiol (ESTRACE) 0.1 mg/g vaginal cream, Start nightly 1 gram in vagina x 2 weeks, then decrease to 2-3x/week.  May decrease to weekly if still effective at lower dose., Disp: 42.5 g, Rfl: 4    She is allergic to ciprofloxacin..    ROS negative except as noted in HPI    Objective:  /62 (BP Location: Left arm, Patient Position: Sitting, Cuff Size: Standard)   Ht 5' 4\" (1.626 m)   Wt 59 kg (130 lb)   LMP 10/07/2024 (Approximate)   BMI 22.31 kg/m²      Physical Exam  Constitutional:       Appearance: Normal appearance.   Chest:   Breasts:     Right: Normal. No mass or tenderness.      Left: Normal. No mass or tenderness.   Abdominal:      Palpations: Abdomen is soft.      Tenderness: There is no abdominal tenderness.   Genitourinary:     General: Normal vulva.      Vagina: No bleeding or lesions.      Cervix: Normal.      Uterus: Normal. Not tender.       Adnexa:         Right: No mass or tenderness.          Left: No mass or tenderness.        Rectum: No mass or external hemorrhoid. Normal anal tone.      Comments: Atrophic changes appropriate to age  Musculoskeletal:         General: Normal range of motion.   Lymphadenopathy:      Upper Body:      Right upper body: No axillary adenopathy.      Left upper body: No axillary adenopathy.   Neurological:      Mental Status: She is alert and oriented to person, place, and time.   Psychiatric:         Mood and Affect: Mood normal.         Behavior: Behavior normal.         "

## 2024-11-07 NOTE — ASSESSMENT & PLAN NOTE
Was using vaginal estrogen for vaginal dryness and pain with intercourse - noted improvement.  States haven't used in a couple months and no pain.  Reviewed proper use and can restart anytime.  Offered refill - she declined.  Will call if needs it.

## 2024-11-07 NOTE — PROGRESS NOTES
Annual Wellness Visit  [facility] - [address], [address], [place] [address]    ASSESSMENT/PLAN: Shital Bertrand is a 53 y.o.  who presents for annual gynecologic exam.    Encounter for routine gynecologic examination  - Routine well woman exam completed today.  - Cervical Cancer Screening: Current ASCCP Guidelines reviewed. Last Pap: 10/06/2023 normal. Past abnormal pap: none.  Next Pap Due: 2 years.  - Contraceptive counseling discussed.  Current contraception: oral progesterone-only contraceptive,   - Breast Cancer Screening: Last Mammogram 2024 per pt normal  - Colorectal cancer screening last 2023 per pt, next due .  - The following were reviewed in today's visit: mammography screening ordered, menopause, exercise, and healthy diet    Additional problems addressed during this visit:  1. Encounter for annual routine gynecological examination  2. Encounter for screening mammogram for malignant neoplasm of breast  -     Mammo screening bilateral w 3d and cad; Future  3. Surveillance for birth control, oral contraceptives  A/P:   On progesterone only pills.  Light periods.  Skips some months.  No issues.  Discussed some irregularity is normal.  Must take some time daily for efficacy as contraception.  Wishes refill.    -     norethindrone (MICRONOR) 0.35 MG tablet; Take 1 tablet (0.35 mg total) by mouth daily  4. Menopausal vaginal dryness  Assessment & Plan:  Was using vaginal estrogen for vaginal dryness and pain with intercourse - noted improvement.  States haven't used in a couple months and no pain.  Reviewed proper use and can restart anytime.  Offered refill - she declined.  Will call if needs it.      Next visit: 1 year Wellness      CC:  Annual Gynecologic Examination    HPI: Shital Bertrand is a 53 y.o.  who presents for annual gynecologic examination.  She denies any breast, urinary or pelvic issues at today's visit.    On progesterone only pills.  Went 6  months without periods and then returned in summer.  LMP 1 month ago.    Sexually active.  No new partners.  Using vaginal estrogen couple times a week with improvement but hasn't used in months and no pain.        Gyn History  Patient's last menstrual period was 10/07/2024 (approximate).     Last Pap: 10/06/2023 was normal    She  reports being sexually active and has had partner(s) who are male. She reports using the following method of birth control/protection: OCP.       OB History      Past Medical History:  2023: History of screening mammography      Comment:  Negative per pt. Done at [place]  No date: IBS (irritable bowel syndrome)  : Tumor of jaw      Comment:  benign     Past Surgical History:  2014:  SECTION  : MANDIBLE SURGERY; Right      Comment:  benign tumor removal with bone graft  1990: WISDOM TOOTH EXTRACTION; N/A     Family History   Problem Relation Age of Onset    Atrial fibrillation Mother     Heart attack Father     No Known Problems Sister     No Known Problems Son     Atrial fibrillation Maternal Grandmother     Parkinsonism Maternal Grandfather     Breast cancer Paternal Grandmother     No Known Problems Paternal Grandfather     Cancer Maternal Aunt     Uterine cancer Neg Hx     Ovarian cancer Neg Hx     Colon cancer Neg Hx         Social History     Tobacco Use    Smoking status: Never    Smokeless tobacco: Never   Vaping Use    Vaping status: Never Used   Substance Use Topics    Alcohol use: Never    Drug use: Never          Current Outpatient Medications:     norethindrone (MICRONOR) 0.35 MG tablet, Take 1 tablet (0.35 mg total) by mouth daily, Disp: 84 tablet, Rfl: 4    estradiol (ESTRACE) 0.1 mg/g vaginal cream, Start nightly 1 gram in vagina x 2 weeks, then decrease to 2-3x/week.  May decrease to weekly if still effective at lower dose., Disp: 42.5 g, Rfl: 4    She is allergic to ciprofloxacin..    ROS negative except as noted in  "HPI    Objective:  /62 (BP Location: Left arm, Patient Position: Sitting, Cuff Size: Standard)   Ht 5' 4\" (1.626 m)   Wt 59 kg (130 lb)   LMP 10/07/2024 (Approximate)   BMI 22.31 kg/m²      Physical Exam  Constitutional:       Appearance: Normal appearance.   Chest:   Breasts:     Right: Normal. No mass or tenderness.      Left: Normal. No mass or tenderness.   Abdominal:      Palpations: Abdomen is soft.      Tenderness: There is no abdominal tenderness.   Genitourinary:     General: Normal vulva.      Vagina: No bleeding or lesions.      Cervix: Normal.      Uterus: Normal. Not tender.       Adnexa:         Right: No mass or tenderness.          Left: No mass or tenderness.        Rectum: No mass or external hemorrhoid. Normal anal tone.      Comments: Atrophic changes appropriate to age  Musculoskeletal:         General: Normal range of motion.   Lymphadenopathy:      Upper Body:      Right upper body: No axillary adenopathy.      Left upper body: No axillary adenopathy.   Neurological:      Mental Status: She is alert and oriented to person, place, and time.   Psychiatric:         Mood and Affect: Mood normal.         Behavior: Behavior normal.         "

## 2025-01-21 NOTE — TELEPHONE ENCOUNTER
Naomi Aden left a message can not get Pelvic U/S testing done at [facility] due to insurance capitation. She needs to go to [facility] was asking for CPT codes. Left CPT codes: 03184 & [id number] on Shital's voice mail. Call back if needs U/S order mailed or faxed. 4 = No assist / stand by assistance

## 2025-02-18 ENCOUNTER — HOSPITAL ENCOUNTER (OUTPATIENT)
Dept: HOSPITAL 99 - HWWDC | Age: 55
End: 2025-02-18
Payer: COMMERCIAL

## 2025-02-18 DIAGNOSIS — Z12.31: Primary | ICD-10-CM

## 2025-05-13 DIAGNOSIS — Z30.41 SURVEILLANCE FOR BIRTH CONTROL, ORAL CONTRACEPTIVES: ICD-10-CM

## 2025-05-13 RX ORDER — ACETAMINOPHEN AND CODEINE PHOSPHATE 120; 12 MG/5ML; MG/5ML
1 SOLUTION ORAL DAILY
Qty: 84 TABLET | Refills: 1 | Status: SHIPPED | OUTPATIENT
Start: 2025-05-13

## 2025-07-16 ENCOUNTER — HOSPITAL ENCOUNTER (OUTPATIENT)
Dept: HOSPITAL 99 - RAD | Age: 55
End: 2025-07-16
Payer: COMMERCIAL

## 2025-07-16 DIAGNOSIS — M54.31: Primary | ICD-10-CM
